# Patient Record
Sex: MALE | Race: ASIAN | NOT HISPANIC OR LATINO
[De-identification: names, ages, dates, MRNs, and addresses within clinical notes are randomized per-mention and may not be internally consistent; named-entity substitution may affect disease eponyms.]

---

## 2020-04-26 ENCOUNTER — MESSAGE (OUTPATIENT)
Age: 60
End: 2020-04-26

## 2020-05-05 LAB
SARS-COV-2 IGG SERPL IA-ACNC: 0.1 INDEX
SARS-COV-2 IGG SERPL QL IA: NEGATIVE

## 2021-08-12 ENCOUNTER — TRANSCRIPTION ENCOUNTER (OUTPATIENT)
Age: 61
End: 2021-08-12

## 2022-02-23 ENCOUNTER — NON-APPOINTMENT (OUTPATIENT)
Age: 62
End: 2022-02-23

## 2022-03-09 ENCOUNTER — NON-APPOINTMENT (OUTPATIENT)
Age: 62
End: 2022-03-09

## 2022-03-10 ENCOUNTER — APPOINTMENT (OUTPATIENT)
Dept: FAMILY MEDICINE | Facility: CLINIC | Age: 62
End: 2022-03-10
Payer: COMMERCIAL

## 2022-03-10 VITALS
OXYGEN SATURATION: 98 % | HEART RATE: 82 BPM | HEIGHT: 71 IN | BODY MASS INDEX: 26.6 KG/M2 | RESPIRATION RATE: 14 BRPM | DIASTOLIC BLOOD PRESSURE: 84 MMHG | SYSTOLIC BLOOD PRESSURE: 124 MMHG | WEIGHT: 190 LBS

## 2022-03-10 VITALS — TEMPERATURE: 97.2 F

## 2022-03-10 DIAGNOSIS — Z80.1 FAMILY HISTORY OF MALIGNANT NEOPLASM OF TRACHEA, BRONCHUS AND LUNG: ICD-10-CM

## 2022-03-10 DIAGNOSIS — Z13.29 ENCOUNTER FOR SCREENING FOR OTHER SUSPECTED ENDOCRINE DISORDER: ICD-10-CM

## 2022-03-10 DIAGNOSIS — Z00.00 ENCOUNTER FOR GENERAL ADULT MEDICAL EXAMINATION W/OUT ABNORMAL FINDINGS: ICD-10-CM

## 2022-03-10 DIAGNOSIS — Z78.9 OTHER SPECIFIED HEALTH STATUS: ICD-10-CM

## 2022-03-10 LAB — HBA1C MFR BLD HPLC: 6.7

## 2022-03-10 PROCEDURE — 99204 OFFICE O/P NEW MOD 45 MIN: CPT

## 2022-03-10 RX ORDER — KRILL/OM-3/DHA/EPA/PHOSPHO/AST 1000-230MG
81 CAPSULE ORAL
Refills: 0 | Status: ACTIVE | COMMUNITY
Start: 2022-03-10

## 2022-03-10 NOTE — PHYSICAL EXAM
[No Lymphadenopathy] : no lymphadenopathy [Supple] : supple [Normal] : normal rate, regular rhythm, normal S1 and S2 and no murmur heard [No Edema] : there was no peripheral edema [No Focal Deficits] : no focal deficits [Normal Affect] : the affect was normal [Normal Mood] : the mood was normal [Normal Insight/Judgement] : insight and judgment were intact [Comprehensive Foot Exam Normal] : Right and left foot were examined and both feet are normal. No ulcers in either foot. Toes are normal and with full ROM.  Normal tactile sensation with monofilament testing throughout both feet [de-identified] : no calf tenderness b/l LE [de-identified] : mild callous on soles of feet

## 2022-03-10 NOTE — PLAN
[FreeTextEntry1] : \par establish care\par \par DM II\par a1c\par ua/microalbumin\par lipid check\par cmp\par podiatrist consult advised\par \par htn\par cmp\par ua\par \par hx prostate cancer\par psa\par pt will find new urologist due to insurance changes \par \par f/u 3 mos and will call 2 weeks prior for lab script \par

## 2022-03-10 NOTE — HISTORY OF PRESENT ILLNESS
[FreeTextEntry1] : patient presents to establish care  [de-identified] : patient presents to establish care \par he is feeling well\par \par cardiologist Dr Lawrence\par urologist Dr Luis Levy\par \par he saw cardiologist recently and saw urologist in november and will f/u with a new urologist because of insurance changes\par he was told to check psa every 6mos\par his psa has been around 0.3 \par \par last cpe was nov 2021\par blood work was in november\par he saw the ophthalmologist in oct 2021\par he doesn’t see a podiatrist\par he sees the dentist q6mos\par

## 2022-03-15 ENCOUNTER — LABORATORY RESULT (OUTPATIENT)
Age: 62
End: 2022-03-15

## 2022-03-16 DIAGNOSIS — R80.9 PROTEINURIA, UNSPECIFIED: ICD-10-CM

## 2022-03-22 ENCOUNTER — LABORATORY RESULT (OUTPATIENT)
Age: 62
End: 2022-03-22

## 2022-03-28 ENCOUNTER — LABORATORY RESULT (OUTPATIENT)
Age: 62
End: 2022-03-28

## 2022-04-14 ENCOUNTER — APPOINTMENT (OUTPATIENT)
Dept: NEPHROLOGY | Facility: CLINIC | Age: 62
End: 2022-04-14
Payer: COMMERCIAL

## 2022-04-14 ENCOUNTER — NON-APPOINTMENT (OUTPATIENT)
Age: 62
End: 2022-04-14

## 2022-04-14 VITALS
HEIGHT: 71 IN | WEIGHT: 190 LBS | BODY MASS INDEX: 26.6 KG/M2 | DIASTOLIC BLOOD PRESSURE: 88 MMHG | OXYGEN SATURATION: 99 % | SYSTOLIC BLOOD PRESSURE: 147 MMHG | HEART RATE: 94 BPM | RESPIRATION RATE: 18 BRPM

## 2022-04-14 PROCEDURE — 99205 OFFICE O/P NEW HI 60 MIN: CPT

## 2022-04-14 NOTE — ASSESSMENT
[FreeTextEntry1] : 1) Microalbuminuria\par 2) HTN\par 3) DM\par 4) History of prostate ca\par \par Will be seeing urologist;\par Will increase enalapril 10mg daily to maximize ACEI effect\par Will send for baseline US renal\par To trend proteinuria and labs; consider checking free light chains and immunofixation on next visit\par \par RTC 6 months

## 2022-04-14 NOTE — HISTORY OF PRESENT ILLNESS
[FreeTextEntry1] : Patient is a 61 year old male with history of DM, HTN for last ten years, history of prostate ca; pt here for initial evaluation of proteinuria on 24 hour urine. 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

## 2022-04-14 NOTE — PHYSICAL EXAM
[General Appearance - Alert] : alert [General Appearance - In No Acute Distress] : in no acute distress [Sclera] : the sclera and conjunctiva were normal [PERRL With Normal Accommodation] : pupils were equal in size, round, and reactive to light [Extraocular Movements] : extraocular movements were intact [Outer Ear] : the ears and nose were normal in appearance [Oropharynx] : the oropharynx was normal [Neck Appearance] : the appearance of the neck was normal [Neck Cervical Mass (___cm)] : no neck mass was observed [Jugular Venous Distention Increased] : there was no jugular-venous distention [Thyroid Diffuse Enlargement] : the thyroid was not enlarged [Thyroid Nodule] : there were no palpable thyroid nodules [Auscultation Breath Sounds / Voice Sounds] : lungs were clear to auscultation bilaterally [Heart Rate And Rhythm] : heart rate was normal and rhythm regular [Heart Sounds] : normal S1 and S2 [Heart Sounds Gallop] : no gallops [Murmurs] : no murmurs [Heart Sounds Pericardial Friction Rub] : no pericardial rub [Full Pulse] : the pedal pulses are present [Edema] : there was no peripheral edema [Bowel Sounds] : normal bowel sounds [Abdomen Soft] : soft [Abdomen Tenderness] : non-tender [Abdomen Mass (___ Cm)] : no abdominal mass palpated [Cervical Lymph Nodes Enlarged Posterior Bilaterally] : posterior cervical [Cervical Lymph Nodes Enlarged Anterior Bilaterally] : anterior cervical [Supraclavicular Lymph Nodes Enlarged Bilaterally] : supraclavicular [Axillary Lymph Nodes Enlarged Bilaterally] : axillary [Inguinal Lymph Nodes Enlarged Bilaterally] : inguinal [Femoral Lymph Nodes Enlarged Bilaterally] : femoral [No CVA Tenderness] : no ~M costovertebral angle tenderness [No Spinal Tenderness] : no spinal tenderness [Abnormal Walk] : normal gait [Nail Clubbing] : no clubbing  or cyanosis of the fingernails [Musculoskeletal - Swelling] : no joint swelling seen [Motor Tone] : muscle strength and tone were normal [Skin Turgor] : normal skin turgor [Skin Color & Pigmentation] : normal skin color and pigmentation [] : no rash [Deep Tendon Reflexes (DTR)] : deep tendon reflexes were 2+ and symmetric [Sensation] : the sensory exam was normal to light touch and pinprick [No Focal Deficits] : no focal deficits

## 2022-04-25 ENCOUNTER — APPOINTMENT (OUTPATIENT)
Dept: UROLOGY | Facility: CLINIC | Age: 62
End: 2022-04-25
Payer: COMMERCIAL

## 2022-04-25 VITALS
DIASTOLIC BLOOD PRESSURE: 81 MMHG | TEMPERATURE: 98.1 F | WEIGHT: 190 LBS | BODY MASS INDEX: 26.6 KG/M2 | HEART RATE: 84 BPM | HEIGHT: 71 IN | SYSTOLIC BLOOD PRESSURE: 127 MMHG

## 2022-04-25 DIAGNOSIS — Z85.46 PERSONAL HISTORY OF MALIGNANT NEOPLASM OF PROSTATE: ICD-10-CM

## 2022-04-25 DIAGNOSIS — C61 MALIGNANT NEOPLASM OF PROSTATE: ICD-10-CM

## 2022-04-25 PROCEDURE — 99204 OFFICE O/P NEW MOD 45 MIN: CPT

## 2022-04-25 NOTE — ASSESSMENT
[FreeTextEntry1] : 60 yo M with T1c adenocarcinoma or prostate, GS 3+4=7, PSA apex 5.9 now s/p Cyberknife EBRT 6/2020 with short course of ADT. PSA acceptable, no LUTS. RTO in 6 months for PSA and sx check or sooner PRN

## 2022-04-25 NOTE — REVIEW OF SYSTEMS
[Seen by urologist before (Name)  ___] : Preciously seen by a urologist: [unfilled] [Joint Pain] : joint pain [Negative] : Heme/Lymph

## 2022-04-25 NOTE — HISTORY OF PRESENT ILLNESS
[FreeTextEntry1] : 61 year old man seen 04/25/2022 with complaint of prostate cancer. He was previously a patient of Dr Levy. PSA apex of 5.9 (2/2020). Prostate biopsy (3/2020) showed GS 3+4=7. MRI 3/2020 P[facility] 3. CT/Bone scan neg 3/2020. He had ADT with cadodex prior to XRT and lupron for seval months after. Then underwent Cyperknife 6/2020. PSA has been acceptably low. See trend below. he denies LUTS. reports ED, but is not interested in sexual activity. No acute complaints. No hematuria, no dysuria, no frequency, no urgency, no hesitancy, no straining. No incontinence. No fevers, no chills, no nausea, no vomiting, no flank pain. \par \par PSA (%FREE) TREND \par 0.32   03/2022\par 0.3   11/2021\par 0.35   07/2021\par 0.5   02/2021\par <0.1   10/2020\par 5.9   01/2020\par 5.8   12/2019\par 4.6   11/2018\par

## 2022-05-03 ENCOUNTER — OUTPATIENT (OUTPATIENT)
Dept: OUTPATIENT SERVICES | Facility: HOSPITAL | Age: 62
LOS: 1 days | End: 2022-05-03
Payer: COMMERCIAL

## 2022-05-03 ENCOUNTER — APPOINTMENT (OUTPATIENT)
Dept: ULTRASOUND IMAGING | Facility: CLINIC | Age: 62
End: 2022-05-03
Payer: COMMERCIAL

## 2022-05-03 DIAGNOSIS — E11.9 TYPE 2 DIABETES MELLITUS WITHOUT COMPLICATIONS: ICD-10-CM

## 2022-05-03 PROCEDURE — 76775 US EXAM ABDO BACK WALL LIM: CPT | Mod: 26

## 2022-05-03 PROCEDURE — 76775 US EXAM ABDO BACK WALL LIM: CPT

## 2022-06-28 ENCOUNTER — LABORATORY RESULT (OUTPATIENT)
Age: 62
End: 2022-06-28

## 2022-07-12 ENCOUNTER — LABORATORY RESULT (OUTPATIENT)
Age: 62
End: 2022-07-12

## 2022-07-12 ENCOUNTER — APPOINTMENT (OUTPATIENT)
Dept: FAMILY MEDICINE | Facility: CLINIC | Age: 62
End: 2022-07-12

## 2022-07-12 VITALS
OXYGEN SATURATION: 97 % | WEIGHT: 197 LBS | HEIGHT: 71 IN | HEART RATE: 87 BPM | SYSTOLIC BLOOD PRESSURE: 142 MMHG | RESPIRATION RATE: 18 BRPM | TEMPERATURE: 97.3 F | BODY MASS INDEX: 27.58 KG/M2 | DIASTOLIC BLOOD PRESSURE: 62 MMHG

## 2022-07-12 VITALS — SYSTOLIC BLOOD PRESSURE: 136 MMHG | DIASTOLIC BLOOD PRESSURE: 78 MMHG

## 2022-07-12 DIAGNOSIS — Z91.89 OTHER SPECIFIED PERSONAL RISK FACTORS, NOT ELSEWHERE CLASSIFIED: ICD-10-CM

## 2022-07-12 DIAGNOSIS — M25.50 PAIN IN UNSPECIFIED JOINT: ICD-10-CM

## 2022-07-12 PROCEDURE — G0444 DEPRESSION SCREEN ANNUAL: CPT | Mod: 59

## 2022-07-12 PROCEDURE — 99396 PREV VISIT EST AGE 40-64: CPT | Mod: 25

## 2022-07-12 PROCEDURE — 36415 COLL VENOUS BLD VENIPUNCTURE: CPT

## 2022-07-12 NOTE — REVIEW OF SYSTEMS
[Negative] : Heme/Lymph [Fever] : no fever [Chills] : no chills [Chest Pain] : no chest pain [Palpitations] : no palpitations [Shortness Of Breath] : no shortness of breath [Cough] : no cough [Abdominal Pain] : no abdominal pain [Nausea] : no nausea [Constipation] : no constipation [Diarrhea] : no diarrhea [Vomiting] : no vomiting [Melena] : no melena [Dysuria] : no dysuria [Hematuria] : no hematuria [Frequency] : no frequency [Itching] : no itching [Mole Changes] : no mole changes [Skin Rash] : no skin rash [Headache] : no headache [Dizziness] : no dizziness [Fainting] : no fainting [Anxiety] : no anxiety [Depression] : no depression

## 2022-07-12 NOTE — PLAN
[FreeTextEntry1] : CPE\par -Labs already done \par -Colonoscopy Screening performed 2019 will repeat 2024 \par -Advised annual ophthalmology, dental and dermatology visits\par -Annual depression screening - negative  \par \par joint pain -hands and knees in morning and improves throughout the day \par r/o autoimmune vs tick diseases\par if no findings advised to see orthopedist\par \par DM II \par continue farxiga 10mg po daily\par continue glimepiride 2mg po daily\par continue metformin 1000mg po q12hrs \par refilled meds \par \par HTN controlled\par continue enalapril 10mg po daily\par continue hctz 25mg m,w,f\par seen by nephrologist for proteinuria\par \par \par f/u if no relief otherwise f/u 3 mos\par pt agreed w/plan

## 2022-07-12 NOTE — HEALTH RISK ASSESSMENT
[No falls in past year] : Patient reported no falls in the past year [0] : 2) Feeling down, depressed, or hopeless: Not at all (0) [PHQ-2 Negative - No further assessment needed] : PHQ-2 Negative - No further assessment needed [[id number]] : 0

## 2022-07-12 NOTE — HISTORY OF PRESENT ILLNESS
[FreeTextEntry1] : Patient presents for annual physical exam. [de-identified] : 60yo M presents for cpe\par he is feeling well\par \par c/o 1 year of joint pain  in hands and knees in morning and improves throughout the day \par denies redness or warmth or swelling of joints\par

## 2022-07-12 NOTE — PHYSICAL EXAM
[No Lymphadenopathy] : no lymphadenopathy [Supple] : supple [No Edema] : there was no peripheral edema [Normal] : soft, non-tender, non-distended, no masses palpated, no HSM and normal bowel sounds [Normal Sphincter Tone] : normal sphincter tone [Normal Posterior Cervical Nodes] : no posterior cervical lymphadenopathy [Normal Anterior Cervical Nodes] : no anterior cervical lymphadenopathy [No CVA Tenderness] : no CVA  tenderness [No Rash] : no rash [No Focal Deficits] : no focal deficits [Normal Affect] : the affect was normal [Normal Mood] : the mood was normal [Normal Insight/Judgement] : insight and judgment were intact [de-identified] : thyroid feels normal  [de-identified] : no calf tenderness b/l LE [de-identified] : prostate feels normal  no nodules or masses palpated, chaperone alejandra  [de-identified] : CN 2-12 INTACT, NORMAL STRENGTH UPPER AND LOWER EXT B/L 5/5, NORMAL RAPID ALTERNATING MOVEMENTS AND FINGER TO NOSE

## 2022-08-04 LAB
A PHAGO GROEL BLD QL NAA+NON-PROBE: NEGATIVE
ANAPLASMA PHAGOCYTOPHILIA IGG ANTIBODIES: NEGATIVE
ANAPLASMA PHAGOCYTOPHILIA IGM ANTIBODIES: NEGATIVE
B BURGDOR AB SER-IMP: NEGATIVE
B BURGDOR IGG+IGM SER QL: 0.1 INDEX
BABESIA ANTIBODIES, IGG: NORMAL
BABESIA ANTIBODIES, IGM: NORMAL
CCP AB SER IA-ACNC: <8 UNITS
CENTROMERE IGG SER-ACNC: <0.2 CD:130001892
DSDNA AB SER-ACNC: 16 IU/ML
E CANIS+EWIN GROEL BLD QL NAA+NON-PROBE: NEGATIVE
E CHAFF GROEL BLD QL NAA+NON-PROBE: NEGATIVE
E MURIS EAUCL GROEL BLD QL NAA+NON-PRB: NEGATIVE
EHRLICIA CHAFFEENIS IGG ANTIBODIES: NEGATIVE
EHRLICIA CHAFFEENIS IGM ANTIBODIES: NEGATIVE
ENA JO1 AB SER IA-ACNC: <0.2 AL
ENA RNP AB SER IA-ACNC: 0.3 AL
ENA SCL70 IGG SER IA-ACNC: <0.2 AL
ENA SM AB SER IA-ACNC: <0.2 AL
ENA SS-A AB SER IA-ACNC: 0.4 AL
ENA SS-B AB SER IA-ACNC: <0.2 AL
F. TULARENSIS AB, IGG: NEGATIVE
F. TULARENSIS AB, IGM: NEGATIVE
F. TULARENSIS INTERPRETATION: NORMAL
GALACTOSE, ALPHA (O215) CONC: 80.4 KUA/L
GALACTOSE-ALPHA-1,3-GALACTOSE IGE: 5
R RICKETTSI IGG CSF-ACNC: NEGATIVE
R RICKETTSI IGM CSF-ACNC: 0.38 INDEX
RF+CCP IGG SER-IMP: NEGATIVE
RHEUMATOID FACT SER QL: <10 IU/ML
RNA POLYMERASE III IGG: 12 UNITS

## 2022-09-23 ENCOUNTER — TRANSCRIPTION ENCOUNTER (OUTPATIENT)
Age: 62
End: 2022-09-23

## 2022-10-10 ENCOUNTER — LABORATORY RESULT (OUTPATIENT)
Age: 62
End: 2022-10-10

## 2022-10-26 ENCOUNTER — APPOINTMENT (OUTPATIENT)
Dept: FAMILY MEDICINE | Facility: CLINIC | Age: 62
End: 2022-10-26

## 2022-10-26 VITALS
SYSTOLIC BLOOD PRESSURE: 124 MMHG | BODY MASS INDEX: 27.58 KG/M2 | DIASTOLIC BLOOD PRESSURE: 80 MMHG | WEIGHT: 197 LBS | RESPIRATION RATE: 14 BRPM | OXYGEN SATURATION: 98 % | HEART RATE: 88 BPM | HEIGHT: 71 IN

## 2022-10-26 VITALS — DIASTOLIC BLOOD PRESSURE: 84 MMHG | SYSTOLIC BLOOD PRESSURE: 130 MMHG

## 2022-10-26 VITALS — TEMPERATURE: 98.2 F

## 2022-10-26 DIAGNOSIS — R79.9 ABNORMAL FINDING OF BLOOD CHEMISTRY, UNSPECIFIED: ICD-10-CM

## 2022-10-26 LAB
BASOPHILS # BLD AUTO: 0.06 K/UL
BASOPHILS NFR BLD AUTO: 1.2 %
EOSINOPHIL # BLD AUTO: 0.13 K/UL
EOSINOPHIL NFR BLD AUTO: 2.6 %
HCT VFR BLD CALC: 49.4 %
HGB BLD-MCNC: 17.1 G/DL
IMM GRANULOCYTES NFR BLD AUTO: 0.2 %
LYMPHOCYTES # BLD AUTO: 1.45 K/UL
LYMPHOCYTES NFR BLD AUTO: 28.8 %
MAN DIFF?: NORMAL
MCHC RBC-ENTMCNC: 30.5 PG
MCHC RBC-ENTMCNC: 34.6 GM/DL
MCV RBC AUTO: 88.2 FL
MONOCYTES # BLD AUTO: 0.37 K/UL
MONOCYTES NFR BLD AUTO: 7.4 %
NEUTROPHILS # BLD AUTO: 3.01 K/UL
NEUTROPHILS NFR BLD AUTO: 59.8 %
PLATELET # BLD AUTO: 224 K/UL
RBC # BLD: 5.6 M/UL
RBC # FLD: 13 %
WBC # FLD AUTO: 5.03 K/UL

## 2022-10-26 PROCEDURE — 99214 OFFICE O/P EST MOD 30 MIN: CPT

## 2022-10-26 NOTE — PHYSICAL EXAM
[No Lymphadenopathy] : no lymphadenopathy [Supple] : supple [Normal] : normal rate, regular rhythm, normal S1 and S2 and no murmur heard [No Edema] : there was no peripheral edema [No Rash] : no rash [No Focal Deficits] : no focal deficits [Normal Affect] : the affect was normal [Normal Mood] : the mood was normal [Normal Insight/Judgement] : insight and judgment were intact [de-identified] : no calf tenderness b/l LE

## 2022-10-26 NOTE — PLAN
[FreeTextEntry1] : \par joint pain -hands and knees in morning and improves throughout the day -he said it resolved \par if no findings advised to see orthopedist-hasnt seen advised to do so if pain reoccurs \par \par DM II  a1c 6.3%\par continue farxiga 10mg po daily\par continue glimepiride 2mg po daily-refilled \par continue metformin 1000mg po q12hrs -refilled \par \par \par HTN controlled\par continue enalapril 10mg po daily\par continue hctz 25mg m,w,f\par seen by nephrologist for proteinuria and will f/u \par refilled meds \par \par repeat cbc today due to slightly elevated hct , advised increase fluids \par labs drawn in office and will be sent to [facility] core lab\par \par f/u 3 months or sooner if issues arise,  pt agreed w/plan\par \par

## 2022-10-26 NOTE — HISTORY OF PRESENT ILLNESS
[FreeTextEntry1] : patient presents for med follow up  [de-identified] : 60yo M presents for f/u for HTN, HLD and DM II\par he is feeling well. \par \par Denies chest pain, palpitations, shortness of breath, Headaches, vision changes or LE edema\par denies muscle pain on statin\par he hasn’t checked his sugars \par bp has been 120-130/80s

## 2023-02-13 ENCOUNTER — NON-APPOINTMENT (OUTPATIENT)
Age: 63
End: 2023-02-13

## 2023-03-11 ENCOUNTER — LABORATORY RESULT (OUTPATIENT)
Age: 63
End: 2023-03-11

## 2023-03-14 LAB
ESTIMATED AVERAGE GLUCOSE: 126 MG/DL
HBA1C MFR BLD HPLC: 6 %

## 2023-03-23 ENCOUNTER — OUTPATIENT (OUTPATIENT)
Dept: OUTPATIENT SERVICES | Facility: HOSPITAL | Age: 63
LOS: 1 days | End: 2023-03-23
Payer: COMMERCIAL

## 2023-03-23 ENCOUNTER — APPOINTMENT (OUTPATIENT)
Dept: CT IMAGING | Facility: CLINIC | Age: 63
End: 2023-03-23
Payer: COMMERCIAL

## 2023-03-23 ENCOUNTER — APPOINTMENT (OUTPATIENT)
Dept: FAMILY MEDICINE | Facility: CLINIC | Age: 63
End: 2023-03-23
Payer: COMMERCIAL

## 2023-03-23 VITALS
HEART RATE: 84 BPM | DIASTOLIC BLOOD PRESSURE: 60 MMHG | RESPIRATION RATE: 14 BRPM | BODY MASS INDEX: 26.6 KG/M2 | SYSTOLIC BLOOD PRESSURE: 120 MMHG | HEIGHT: 71 IN | TEMPERATURE: 94 F | OXYGEN SATURATION: 98 % | WEIGHT: 190 LBS

## 2023-03-23 DIAGNOSIS — Z00.8 ENCOUNTER FOR OTHER GENERAL EXAMINATION: ICD-10-CM

## 2023-03-23 DIAGNOSIS — Z76.89 PERSONS ENCOUNTERING HEALTH SERVICES IN OTHER SPECIFIED CIRCUMSTANCES: ICD-10-CM

## 2023-03-23 DIAGNOSIS — R06.09 OTHER FORMS OF DYSPNEA: ICD-10-CM

## 2023-03-23 PROCEDURE — 75574 CT ANGIO HRT W/3D IMAGE: CPT | Mod: 26

## 2023-03-23 PROCEDURE — 75574 CT ANGIO HRT W/3D IMAGE: CPT

## 2023-03-23 PROCEDURE — 99214 OFFICE O/P EST MOD 30 MIN: CPT

## 2023-03-23 NOTE — PLAN
[FreeTextEntry1] : HTN\par well controlled\par continue with current dose of medications\par \par DM\par well controlled\par renewed medications above\par \par HLD\par LDL 98\par goal to be under 70\par he will discuss increasing dose with cardiologist and next visit\par \par Reviewed all labs from 3/11/23 as well as A1C \par he is doing excellent\par \par He will RTO in 3 months for physical exam

## 2023-03-27 RX ORDER — ATORVASTATIN CALCIUM 10 MG/1
10 TABLET, FILM COATED ORAL DAILY
Qty: 90 | Refills: 0 | Status: DISCONTINUED | COMMUNITY
Start: 2022-03-10 | End: 2023-03-27

## 2023-06-13 ENCOUNTER — NON-APPOINTMENT (OUTPATIENT)
Age: 63
End: 2023-06-13

## 2023-07-07 ENCOUNTER — LABORATORY RESULT (OUTPATIENT)
Age: 63
End: 2023-07-07

## 2023-07-13 ENCOUNTER — APPOINTMENT (OUTPATIENT)
Dept: FAMILY MEDICINE | Facility: CLINIC | Age: 63
End: 2023-07-13
Payer: COMMERCIAL

## 2023-07-13 VITALS
TEMPERATURE: 97.6 F | BODY MASS INDEX: 26.46 KG/M2 | HEART RATE: 96 BPM | SYSTOLIC BLOOD PRESSURE: 130 MMHG | HEIGHT: 71 IN | RESPIRATION RATE: 15 BRPM | DIASTOLIC BLOOD PRESSURE: 80 MMHG | OXYGEN SATURATION: 97 % | WEIGHT: 189 LBS

## 2023-07-13 VITALS — SYSTOLIC BLOOD PRESSURE: 122 MMHG | DIASTOLIC BLOOD PRESSURE: 76 MMHG

## 2023-07-13 DIAGNOSIS — R22.1 LOCALIZED SWELLING, MASS AND LUMP, NECK: ICD-10-CM

## 2023-07-13 DIAGNOSIS — Z00.00 ENCOUNTER FOR GENERAL ADULT MEDICAL EXAMINATION W/OUT ABNORMAL FINDINGS: ICD-10-CM

## 2023-07-13 PROCEDURE — 99396 PREV VISIT EST AGE 40-64: CPT

## 2023-07-13 NOTE — HEALTH RISK ASSESSMENT
[No] : In the past 12 months have you used drugs other than those required for medical reasons? No [No falls in past year] : Patient reported no falls in the past year [0] : 2) Feeling down, depressed, or hopeless: Not at all (0) [PHQ-2 Negative - No further assessment needed] : PHQ-2 Negative - No further assessment needed [Never] : Never [With Significant Other] : lives with significant other [With Family] : lives with family [# of Members in Household ___] :  household currently consist of [unfilled] member(s) [Retired] : retired [Fully functional (bathing, dressing, toileting, transferring, walking, feeding)] : Fully functional (bathing, dressing, toileting, transferring, walking, feeding) [Audit-CScore] : 0 [ColonoscopyComments] : Last done 2019

## 2023-07-13 NOTE — HISTORY OF PRESENT ILLNESS
[FreeTextEntry1] : Patient presents for annual physical exam\par  [de-identified] : 63yo M presents for cpe. \par \par He has no physical complaints and is compliant with meds. He reports no myalgias, no shortness of breath and no dizziness\par \par He follows with urology, nephrology. He has a recent eye exam. He saw the podiatrist last year

## 2023-07-13 NOTE — PLAN
[FreeTextEntry1] : Physical exam\par - up to date on vaccinations (including shingles, covid, and pneumonia)\par - colonoscopy done 2019: next colo due 2024\par - screening labs done and within expected limits\par - pt to go to dermatologist for routine skin check\par \par Neck Enlargement\par Mild diffuse enlargement present on exam, no discreet masses felt\par - f/u thyroid us\par \par DM:\par  hgA1c 6.0; no proteinuria on UA; continue current regimen\par - Continue Farxiga 10 MG daily\par - Continue Glimepiride 2 MG daily\par -Continue  metformin - 1000 MG bid\par - patient up to date on vision screening, he will visit podiatrist, foot exam performed \par \par HTN\par Well controlled, no proteinuria on UA, no side effects, continue current regimen\par - pt to f/u with nephrologist\par -Continue Enalapril Maleate 10 MG daily\par - Continue hztz 25mg on Mon, Wed, Friday\par \par

## 2023-07-13 NOTE — PHYSICAL EXAM
[No Acute Distress] : no acute distress [Well Developed] : well developed [Well-Appearing] : well-appearing [PERRL] : pupils equal round and reactive to light [EOMI] : extraocular movements intact [Normal Outer Ear/Nose] : the outer ears and nose were normal in appearance [Normal Oropharynx] : the oropharynx was normal [Normal TMs] : both tympanic membranes were normal [Normal Nasal Mucosa] : the nasal mucosa was normal [No Lymphadenopathy] : no lymphadenopathy [No Respiratory Distress] : no respiratory distress  [Clear to Auscultation] : lungs were clear to auscultation bilaterally [Normal Rate] : normal rate  [Regular Rhythm] : with a regular rhythm [No Murmur] : no murmur heard [Soft] : abdomen soft [Non Tender] : non-tender [Non-distended] : non-distended [No Masses] : no abdominal mass palpated [Normal Bowel Sounds] : normal bowel sounds [Grossly Normal Strength/Tone] : grossly normal strength/tone [Coordination Grossly Intact] : coordination grossly intact [No Focal Deficits] : no focal deficits [Normal Gait] : normal gait [Speech Grossly Normal] : speech grossly normal [Normal Affect] : the affect was normal [Normal Insight/Judgement] : insight and judgment were intact [Comprehensive Foot Exam Normal] : Right and left foot were examined and both feet are normal. No ulcers in either foot. Toes are normal and with full ROM.  Normal tactile sensation with monofilament testing throughout both feet [de-identified] : Mild fullness of thyroid on palpation, no distinct nodules palpated [FreeTextEntry1] : Defer prostate exam given upcoming urologist visit [de-identified] : Monofilament test normal: Sensation intact and equal b/l, no lesions/ ulcers on the foot, dorsalis pedis and posterior tibial pulse present b/l

## 2023-07-13 NOTE — REVIEW OF SYSTEMS
[Negative] : Neurological [Chest Pain] : no chest pain [Palpitations] : no palpitations [Shortness Of Breath] : no shortness of breath [Cough] : no cough [Dysuria] : no dysuria [Incontinence] : no incontinence [Suicidal] : not suicidal [Anxiety] : no anxiety [Depression] : no depression

## 2023-07-31 ENCOUNTER — APPOINTMENT (OUTPATIENT)
Dept: ULTRASOUND IMAGING | Facility: CLINIC | Age: 63
End: 2023-07-31
Payer: COMMERCIAL

## 2023-07-31 ENCOUNTER — OUTPATIENT (OUTPATIENT)
Dept: OUTPATIENT SERVICES | Facility: HOSPITAL | Age: 63
LOS: 1 days | End: 2023-07-31
Payer: COMMERCIAL

## 2023-07-31 DIAGNOSIS — R22.1 LOCALIZED SWELLING, MASS AND LUMP, NECK: ICD-10-CM

## 2023-07-31 DIAGNOSIS — Z00.8 ENCOUNTER FOR OTHER GENERAL EXAMINATION: ICD-10-CM

## 2023-07-31 PROCEDURE — 76536 US EXAM OF HEAD AND NECK: CPT

## 2023-07-31 PROCEDURE — 76536 US EXAM OF HEAD AND NECK: CPT | Mod: 26

## 2023-09-13 ENCOUNTER — RX RENEWAL (OUTPATIENT)
Age: 63
End: 2023-09-13

## 2023-10-31 ENCOUNTER — LABORATORY RESULT (OUTPATIENT)
Age: 63
End: 2023-10-31

## 2023-11-07 ENCOUNTER — APPOINTMENT (OUTPATIENT)
Dept: FAMILY MEDICINE | Facility: CLINIC | Age: 63
End: 2023-11-07
Payer: COMMERCIAL

## 2023-11-07 VITALS
HEART RATE: 98 BPM | OXYGEN SATURATION: 98 % | RESPIRATION RATE: 15 BRPM | BODY MASS INDEX: 27.16 KG/M2 | SYSTOLIC BLOOD PRESSURE: 128 MMHG | DIASTOLIC BLOOD PRESSURE: 76 MMHG | HEIGHT: 71 IN | TEMPERATURE: 98.2 F | WEIGHT: 194 LBS

## 2023-11-07 PROCEDURE — 99214 OFFICE O/P EST MOD 30 MIN: CPT

## 2023-11-07 RX ORDER — METFORMIN HYDROCHLORIDE 1000 MG/1
1000 TABLET, COATED ORAL
Qty: 180 | Refills: 0 | Status: DISCONTINUED | COMMUNITY
Start: 2023-09-13 | End: 2023-11-07

## 2023-11-07 RX ORDER — DAPAGLIFLOZIN 10 MG/1
10 TABLET, FILM COATED ORAL DAILY
Qty: 90 | Refills: 1 | Status: DISCONTINUED | COMMUNITY
Start: 2022-03-10 | End: 2023-11-07

## 2023-11-07 RX ORDER — GLIMEPIRIDE 2 MG/1
2 TABLET ORAL DAILY
Qty: 90 | Refills: 1 | Status: DISCONTINUED | COMMUNITY
Start: 2022-03-10 | End: 2023-11-07

## 2024-01-10 RX ORDER — HYDROCHLOROTHIAZIDE 25 MG/1
25 TABLET ORAL
Qty: 36 | Refills: 0 | Status: ACTIVE | COMMUNITY
Start: 2023-09-13 | End: 1900-01-01

## 2024-01-15 ENCOUNTER — NON-APPOINTMENT (OUTPATIENT)
Age: 64
End: 2024-01-15

## 2024-01-31 ENCOUNTER — OFFICE (OUTPATIENT)
Dept: URBAN - METROPOLITAN AREA CLINIC 103 | Facility: CLINIC | Age: 64
Setting detail: OPHTHALMOLOGY
End: 2024-01-31
Payer: MEDICARE

## 2024-01-31 DIAGNOSIS — H25.013: ICD-10-CM

## 2024-01-31 DIAGNOSIS — H18.413: ICD-10-CM

## 2024-01-31 DIAGNOSIS — H25.13: ICD-10-CM

## 2024-01-31 DIAGNOSIS — E11.3293: ICD-10-CM

## 2024-01-31 DIAGNOSIS — H25.042: ICD-10-CM

## 2024-01-31 PROCEDURE — 92004 COMPRE OPH EXAM NEW PT 1/>: CPT | Performed by: OPHTHALMOLOGY

## 2024-01-31 PROCEDURE — 92250 FUNDUS PHOTOGRAPHY W/I&R: CPT | Performed by: OPHTHALMOLOGY

## 2024-01-31 ASSESSMENT — REFRACTION_CURRENTRX
OD_CYLINDER: -0.50
OS_OVR_VA: 20/
OD_SPHERE: +2.25
OD_AXIS: 091
OS_AXIS: 088
OD_VPRISM_DIRECTION: SV
OD_OVR_VA: 20/
OS_CYLINDER: -0.50
OS_VPRISM_DIRECTION: SV
OS_SPHERE: +3.25

## 2024-01-31 ASSESSMENT — CONFRONTATIONAL VISUAL FIELD TEST (CVF)
OD_FINDINGS: FULL
OS_FINDINGS: FULL

## 2024-05-02 ENCOUNTER — APPOINTMENT (OUTPATIENT)
Dept: FAMILY MEDICINE | Facility: CLINIC | Age: 64
End: 2024-05-02
Payer: MEDICARE

## 2024-05-02 VITALS
SYSTOLIC BLOOD PRESSURE: 122 MMHG | HEART RATE: 83 BPM | BODY MASS INDEX: 27.16 KG/M2 | WEIGHT: 194 LBS | DIASTOLIC BLOOD PRESSURE: 78 MMHG | HEIGHT: 71 IN | TEMPERATURE: 97.7 F | RESPIRATION RATE: 15 BRPM | OXYGEN SATURATION: 98 %

## 2024-05-02 VITALS — DIASTOLIC BLOOD PRESSURE: 78 MMHG | SYSTOLIC BLOOD PRESSURE: 124 MMHG

## 2024-05-02 DIAGNOSIS — E11.9 TYPE 2 DIABETES MELLITUS W/OUT COMPLICATIONS: ICD-10-CM

## 2024-05-02 DIAGNOSIS — I10 ESSENTIAL (PRIMARY) HYPERTENSION: ICD-10-CM

## 2024-05-02 PROCEDURE — 99214 OFFICE O/P EST MOD 30 MIN: CPT

## 2024-05-02 PROCEDURE — G2211 COMPLEX E/M VISIT ADD ON: CPT

## 2024-05-02 RX ORDER — ATORVASTATIN CALCIUM 20 MG/1
20 TABLET, FILM COATED ORAL
Qty: 90 | Refills: 0 | Status: ACTIVE | COMMUNITY
Start: 2023-03-27 | End: 1900-01-01

## 2024-05-02 RX ORDER — HYDROCHLOROTHIAZIDE 25 MG/1
25 TABLET ORAL
Qty: 90 | Refills: 0 | Status: ACTIVE | COMMUNITY
Start: 2022-03-10 | End: 1900-01-01

## 2024-05-02 RX ORDER — METFORMIN HYDROCHLORIDE 1000 MG/1
1000 TABLET, COATED ORAL
Qty: 180 | Refills: 1 | Status: ACTIVE | COMMUNITY
Start: 2022-03-10 | End: 1900-01-01

## 2024-05-02 RX ORDER — GLIMEPIRIDE 2 MG/1
2 TABLET ORAL
Qty: 90 | Refills: 1 | Status: ACTIVE | COMMUNITY
Start: 2023-09-13 | End: 1900-01-01

## 2024-05-02 RX ORDER — ENALAPRIL MALEATE 10 MG/1
10 TABLET ORAL DAILY
Qty: 90 | Refills: 0 | Status: ACTIVE | COMMUNITY
Start: 2022-03-10 | End: 1900-01-01

## 2024-05-02 NOTE — HISTORY OF PRESENT ILLNESS
[FreeTextEntry1] : patient presents for follow up visit. [de-identified] : 64yo M presents for f/u for HTN, HLD, DM II he is feeling well  Denies chest pain, palpitations, shortness of breath, Headaches, vision changes or LE edema, muscle pain  denies hypoglycemic episodes   he saw an ophthalmologist

## 2024-05-02 NOTE — PHYSICAL EXAM
[Supple] : supple [Normal] : no respiratory distress, lungs were clear to auscultation bilaterally and no accessory muscle use [Normal Rate] : normal rate  [Regular Rhythm] : with a regular rhythm [Normal S1, S2] : normal S1 and S2 [No Edema] : there was no peripheral edema [No Rash] : no rash [No Focal Deficits] : no focal deficits [Normal Affect] : the affect was normal [Normal Mood] : the mood was normal [Normal Insight/Judgement] : insight and judgment were intact [de-identified] : right sided neck fullness r/o LNs right sided neck fullness r/o LNs right sided neck fullness r/o LNs [de-identified] : no calf tenderness b/l LE

## 2024-05-02 NOTE — PLAN
[FreeTextEntry1] : HTN controlled continue hctz 25mg po daily continue enalapril 10mg po daily   DM II a1c 6.4 up from 6.0 , he went on vacation recently continue farxiga 10mg po daily continue glimepiride 2mg po daily continue metformin 1000mg po q12hrs a1c 6.6  HLD controlled continue atorvastatin 20mg po qhs denies muscle pain  right sided neck fullness r/o LNs us neck  meds refilled labs reviewed w/ patient f/u 3mos or sooner if issues arise pt agreed w/plan. advised if patient doesnt hear from me 1 week after testing to call office for results , patient agreed w/plan and understood.

## 2024-05-13 RX ORDER — DAPAGLIFLOZIN 10 MG/1
10 TABLET, FILM COATED ORAL
Qty: 90 | Refills: 1 | Status: ACTIVE | COMMUNITY
Start: 2023-09-13 | End: 1900-01-01

## 2024-07-06 ENCOUNTER — NON-APPOINTMENT (OUTPATIENT)
Age: 64
End: 2024-07-06

## 2024-09-03 ENCOUNTER — APPOINTMENT (OUTPATIENT)
Dept: FAMILY MEDICINE | Facility: CLINIC | Age: 64
End: 2024-09-03

## 2024-09-06 ENCOUNTER — APPOINTMENT (OUTPATIENT)
Dept: FAMILY MEDICINE | Facility: CLINIC | Age: 64
End: 2024-09-06
Payer: MEDICARE

## 2024-09-06 VITALS
HEIGHT: 71 IN | BODY MASS INDEX: 27.44 KG/M2 | TEMPERATURE: 98.8 F | SYSTOLIC BLOOD PRESSURE: 130 MMHG | DIASTOLIC BLOOD PRESSURE: 74 MMHG | OXYGEN SATURATION: 98 % | RESPIRATION RATE: 14 BRPM | WEIGHT: 196 LBS | HEART RATE: 98 BPM

## 2024-09-06 DIAGNOSIS — E11.9 TYPE 2 DIABETES MELLITUS W/OUT COMPLICATIONS: ICD-10-CM

## 2024-09-06 DIAGNOSIS — I10 ESSENTIAL (PRIMARY) HYPERTENSION: ICD-10-CM

## 2024-09-06 DIAGNOSIS — Z12.83 ENCOUNTER FOR SCREENING FOR MALIGNANT NEOPLASM OF SKIN: ICD-10-CM

## 2024-09-06 DIAGNOSIS — Z12.11 ENCOUNTER FOR SCREENING FOR MALIGNANT NEOPLASM OF COLON: ICD-10-CM

## 2024-09-06 DIAGNOSIS — Z00.00 ENCOUNTER FOR GENERAL ADULT MEDICAL EXAMINATION W/OUT ABNORMAL FINDINGS: ICD-10-CM

## 2024-09-06 PROCEDURE — G0402 INITIAL PREVENTIVE EXAM: CPT

## 2024-09-06 RX ORDER — AMOXICILLIN 500 MG/1
500 CAPSULE ORAL
Qty: 21 | Refills: 0 | Status: DISCONTINUED | COMMUNITY
Start: 2024-05-14

## 2024-09-06 RX ORDER — EMPAGLIFLOZIN 25 MG/1
25 TABLET, FILM COATED ORAL DAILY
Qty: 90 | Refills: 0 | Status: ACTIVE | COMMUNITY
Start: 2024-09-06 | End: 1900-01-01

## 2024-09-06 NOTE — PHYSICAL EXAM
[No Lymphadenopathy] : no lymphadenopathy [Supple] : supple [No Edema] : there was no peripheral edema [Normal] : soft, non-tender, non-distended, no masses palpated, no HSM and normal bowel sounds [Normal Posterior Cervical Nodes] : no posterior cervical lymphadenopathy [Normal Anterior Cervical Nodes] : no anterior cervical lymphadenopathy [No CVA Tenderness] : no CVA  tenderness [No Rash] : no rash [No Focal Deficits] : no focal deficits [Normal Affect] : the affect was normal [Normal Mood] : the mood was normal [Normal Insight/Judgement] : insight and judgment were intact [de-identified] : thyroid feels enlarged [de-identified] : no calf tenderness b/l LE [de-identified] :  no guarding or rigidity [de-identified] : seeing urologist for prostate exam  [de-identified] : CN 2-12 INTACT, NORMAL STRENGTH UPPER AND LOWER EXT B/L 5/5, NORMAL RAPID ALTERNATING MOVEMENTS AND FINGER TO NOSE

## 2024-09-06 NOTE — HISTORY OF PRESENT ILLNESS
[de-identified] : 64yo M presents for medicare wellness he had flu vaccine and covid vaccines already

## 2024-09-06 NOTE — PLAN
[FreeTextEntry1] : medicare wellness -Labs reviewed with patient  -Advised annual ophthalmology, dental and dermatology visits-given script  -Annual depression screening - negative  due for colonoscopy given script   HTN controlled continue hctz 25mg po daily continue enalapril 10mg po daily   DM II  continue farxiga 10mg po daily-he said this is expensive we can try alternatives, will try jardiance and see if it is covered  continue glimepiride 2mg po daily continue metformin 1000mg po q12hrs a1c 6.4  HLD controlled continue atorvastatin 20mg po qhs denies muscle pain  given script for labs in 3mos  f/u3 mos or sooner if issues arise pt agreed w/plan

## 2024-09-06 NOTE — HEALTH RISK ASSESSMENT
[No falls in past year] : Patient reported no falls in the past year [0] : 2) Feeling down, depressed, or hopeless: Not at all (0) [PHQ-2 Negative - No further assessment needed] : PHQ-2 Negative - No further assessment needed [Never] : Never [Patient reported colonoscopy was abnormal] : Patient reported colonoscopy was abnormal [Fully functional (bathing, dressing, toileting, transferring, walking, feeding)] : Fully functional (bathing, dressing, toileting, transferring, walking, feeding) [Fully functional (using the telephone, shopping, preparing meals, housekeeping, doing laundry, using] : Fully functional and needs no help or supervision to perform IADLs (using the telephone, shopping, preparing meals, housekeeping, doing laundry, using transportation, managing medications and managing finances) [[id number]] : 0 [ColonoscopyDate] : 10/2019 [ColonoscopyComments] : polyps

## 2025-02-04 ENCOUNTER — OFFICE (OUTPATIENT)
Dept: URBAN - METROPOLITAN AREA CLINIC 103 | Facility: CLINIC | Age: 65
Setting detail: OPHTHALMOLOGY
End: 2025-02-04
Payer: MEDICARE

## 2025-02-04 ENCOUNTER — APPOINTMENT (OUTPATIENT)
Dept: FAMILY MEDICINE | Facility: CLINIC | Age: 65
End: 2025-02-04
Payer: MEDICARE

## 2025-02-04 VITALS
BODY MASS INDEX: 27.51 KG/M2 | DIASTOLIC BLOOD PRESSURE: 70 MMHG | OXYGEN SATURATION: 98 % | HEIGHT: 71 IN | HEART RATE: 88 BPM | WEIGHT: 196.5 LBS | SYSTOLIC BLOOD PRESSURE: 120 MMHG | RESPIRATION RATE: 15 BRPM | TEMPERATURE: 97.8 F

## 2025-02-04 DIAGNOSIS — H35.033: ICD-10-CM

## 2025-02-04 DIAGNOSIS — H26.9 UNSPECIFIED CATARACT: ICD-10-CM

## 2025-02-04 DIAGNOSIS — E11.3293: ICD-10-CM

## 2025-02-04 DIAGNOSIS — H25.042: ICD-10-CM

## 2025-02-04 DIAGNOSIS — H25.13: ICD-10-CM

## 2025-02-04 DIAGNOSIS — H25.013: ICD-10-CM

## 2025-02-04 DIAGNOSIS — I10 ESSENTIAL (PRIMARY) HYPERTENSION: ICD-10-CM

## 2025-02-04 DIAGNOSIS — E11.9 TYPE 2 DIABETES MELLITUS W/OUT COMPLICATIONS: ICD-10-CM

## 2025-02-04 DIAGNOSIS — H18.413: ICD-10-CM

## 2025-02-04 PROCEDURE — 92014 COMPRE OPH EXAM EST PT 1/>: CPT | Performed by: OPHTHALMOLOGY

## 2025-02-04 PROCEDURE — 99214 OFFICE O/P EST MOD 30 MIN: CPT

## 2025-02-04 PROCEDURE — 92250 FUNDUS PHOTOGRAPHY W/I&R: CPT | Performed by: OPHTHALMOLOGY

## 2025-02-04 PROCEDURE — G2211 COMPLEX E/M VISIT ADD ON: CPT

## 2025-02-04 ASSESSMENT — KERATOMETRY
OD_K1POWER_DIOPTERS: 42.25
OS_K2POWER_DIOPTERS: 42.50
OS_K1POWER_DIOPTERS: 42.25
OS_AXISANGLE_DEGREES: 049
OD_AXISANGLE_DEGREES: 070
OD_K2POWER_DIOPTERS: 43.00

## 2025-02-04 ASSESSMENT — REFRACTION_CURRENTRX
OS_CYLINDER: -0.50
OS_OVR_VA: 20/
OD_SPHERE: +2.25
OD_VPRISM_DIRECTION: SV
OD_CYLINDER: -0.50
OD_AXIS: 091
OS_SPHERE: +3.25
OS_VPRISM_DIRECTION: SV
OD_OVR_VA: 20/
OS_AXIS: 088

## 2025-02-04 ASSESSMENT — REFRACTION_AUTOREFRACTION
OD_CYLINDER: -0.50
OD_AXIS: 081
OS_CYLINDER: -0.50
OS_AXIS: 084
OS_SPHERE: +1.00
OD_SPHERE: PLANO

## 2025-02-04 ASSESSMENT — VISUAL ACUITY
OS_BCVA: 20/30
OD_BCVA: 20/40+1

## 2025-02-04 ASSESSMENT — TONOMETRY
OD_IOP_MMHG: 15
OS_IOP_MMHG: 13

## 2025-02-11 ENCOUNTER — NON-APPOINTMENT (OUTPATIENT)
Age: 65
End: 2025-02-11

## 2025-03-03 ENCOUNTER — RX RENEWAL (OUTPATIENT)
Age: 65
End: 2025-03-03

## 2025-03-07 ENCOUNTER — APPOINTMENT (OUTPATIENT)
Dept: FAMILY MEDICINE | Facility: CLINIC | Age: 65
End: 2025-03-07

## 2025-06-10 ENCOUNTER — NON-APPOINTMENT (OUTPATIENT)
Age: 65
End: 2025-06-10

## 2025-06-10 ENCOUNTER — APPOINTMENT (OUTPATIENT)
Dept: FAMILY MEDICINE | Facility: CLINIC | Age: 65
End: 2025-06-10
Payer: MEDICARE

## 2025-06-10 VITALS
OXYGEN SATURATION: 96 % | DIASTOLIC BLOOD PRESSURE: 70 MMHG | BODY MASS INDEX: 26.74 KG/M2 | TEMPERATURE: 98.7 F | SYSTOLIC BLOOD PRESSURE: 120 MMHG | HEIGHT: 71 IN | HEART RATE: 94 BPM | RESPIRATION RATE: 15 BRPM | WEIGHT: 191 LBS

## 2025-06-10 PROCEDURE — G2211 COMPLEX E/M VISIT ADD ON: CPT

## 2025-06-10 PROCEDURE — 99214 OFFICE O/P EST MOD 30 MIN: CPT

## 2025-06-10 RX ORDER — BLOOD SUGAR DIAGNOSTIC
STRIP MISCELLANEOUS
Qty: 1 | Refills: 5 | Status: ACTIVE | COMMUNITY
Start: 2025-06-10 | End: 1900-01-01

## 2025-06-10 RX ORDER — BLOOD-GLUCOSE METER
70 EACH MISCELLANEOUS
Qty: 1 | Refills: 5 | Status: ACTIVE | COMMUNITY
Start: 2025-06-10 | End: 1900-01-01

## 2025-06-10 RX ORDER — BLOOD-GLUCOSE METER
W/DEVICE EACH MISCELLANEOUS
Qty: 1 | Refills: 1 | Status: ACTIVE | COMMUNITY
Start: 2025-06-10 | End: 1900-01-01

## 2025-06-14 ENCOUNTER — RX RENEWAL (OUTPATIENT)
Age: 65
End: 2025-06-14

## 2025-06-14 RX ORDER — LANCETS 30 GAUGE
EACH MISCELLANEOUS
Qty: 100 | Refills: 0 | Status: ACTIVE | COMMUNITY
Start: 2025-06-11 | End: 1900-01-01